# Patient Record
Sex: MALE | Race: BLACK OR AFRICAN AMERICAN
[De-identification: names, ages, dates, MRNs, and addresses within clinical notes are randomized per-mention and may not be internally consistent; named-entity substitution may affect disease eponyms.]

---

## 2017-07-14 ENCOUNTER — HOSPITAL ENCOUNTER (OUTPATIENT)
Dept: HOSPITAL 62 - SC | Age: 52
Discharge: HOME | End: 2017-07-14
Attending: PODIATRIST
Payer: COMMERCIAL

## 2017-07-14 DIAGNOSIS — G43.909: ICD-10-CM

## 2017-07-14 DIAGNOSIS — F17.210: ICD-10-CM

## 2017-07-14 DIAGNOSIS — J45.909: ICD-10-CM

## 2017-07-14 DIAGNOSIS — Z88.0: ICD-10-CM

## 2017-07-14 DIAGNOSIS — Z88.5: ICD-10-CM

## 2017-07-14 DIAGNOSIS — L60.0: Primary | ICD-10-CM

## 2017-07-14 DIAGNOSIS — Z88.6: ICD-10-CM

## 2017-07-14 PROCEDURE — 11750 EXCISION NAIL&NAIL MATRIX: CPT

## 2017-07-14 PROCEDURE — 88304 TISSUE EXAM BY PATHOLOGIST: CPT

## 2017-07-14 PROCEDURE — 0HTRXZZ RESECTION OF TOE NAIL, EXTERNAL APPROACH: ICD-10-PCS | Performed by: PODIATRIST

## 2017-07-14 RX ADMIN — POVIDONE-IODINE ONE APPLIC: 100 OINTMENT TOPICAL at 00:00

## 2017-07-14 RX ADMIN — POVIDONE-IODINE ONE APPLIC: 100 OINTMENT TOPICAL at 11:29

## 2017-07-14 NOTE — [facility] OPERATIVE REPORT E
[facility] Operative Report



NAME: RO SORIANO

                                      MRN: [id number]

                                      AGE: 52Y

DATE OF SURGERY: 07/14/2017          ROOM:



PREOPERATIVE DIAGNOSIS:

Onychoincurvatus hallux bilateral.



POSTOPERATIVE DIAGNOSIS:

Onychoincurvatus hallux bilateral.



PROCEDURE PERFORMED:

1.  Partial nail avulsion, medial and lateral nail borders, hallux

bilateral.

2.  Partial matricectomy, medial and lateral corners of matrix, hallux

bilateral.



SURGEON:

SIDNEY STEPHENSON D.P.M.



FINDINGS:

Intraoperative findings indicated deeply incurvated medial and lateral nail

borders causing continuous friction into the nail grooves which has led to

lots of discomfort and pain, especially when the patient wears closed-toe

shoes.  Intraoperative findings were confirmed clinically and

radiographically.



PROCEDURE:

With the patient laying in a dorsal recumbent position, both feet were

prepped and draped in the usual standard sterile orthopedic manner after

the local anesthesia was administered which was a digital block.  The type

of anesthesia utilized was a 50/50 mixture of 2% Xylocaine and 0.5%

Marcaine.  After the anesthetic effect was accomplished attention was

directed to the right hallux first.  At this point the medial and lateral

nail borders were removed en toto.  The nail grooves were cleaned from any

debris.  Next, a digital tourniquet was applied at the base of the right

hallux to control hemostasis.  At this point two 1 cm in length oblique

incisions were placed at the medial and lateral junction of the eponychium

of the proximal nail groove.  The incisions were angulated about 45 degrees

to the long axis of the distal phalanx.  At this point the incisions were

taken all the way to bone.  The skin flaps were created.  The corners of

the matrix on the medial and lateral sides were excised en toto.  The bone

was curetted to remove any remnants of matrix that had been left behind,

and after that electrodesiccation was performed to eradicate any remnants

of matrix tissue.  The electrodesiccation also helped with hemostasis and

to create a sterile environment.  The area was irrigated.  The dead spaces

were packed with Gelfoam.  The skin flaps were repositioned and anchored

down with 4-0 nylon using continuous interlocked stitch.  At this point

Betadine compression dressing was applied around the right hallux.  The

digital tourniquet was removed and circulation returned to normal

immediately as the normal digital color and temperature became apparent.



Next, attention was directed to the left hallux and exactly the same

procedures were performed at this point.



The patient tolerated the procedures well and left the operating room with

stable vital signs and in good condition.  The patient was taken to the

recovery room alert, conscious and oriented.  There are no permanent

disabilities anticipated at this time.  The patient was discharged home

with instructions for postoperative care at home.  The patient was given a

prescription for pain medicine and antibiotics.  Follow-up appointment was

set in the office in 72 hours.





DICTATING PHYSICIAN: SIDNEY STEPHENSON D.P.M.



[id number]              DT: 07/14/2017 1205

PHY#: [id number]          DD: 07/14/2017 1147

ID:   [id number]               JOB#: [id number]       ACCT: [id number]



cc:SIDNEY STEPHENSON D.P.M.

>

## 2020-08-06 ENCOUNTER — HOSPITAL ENCOUNTER (OUTPATIENT)
Dept: HOSPITAL 62 - OD | Age: 55
End: 2020-08-06
Payer: OTHER GOVERNMENT

## 2020-08-06 DIAGNOSIS — Z51.81: Primary | ICD-10-CM

## 2020-08-06 DIAGNOSIS — Z79.1: ICD-10-CM

## 2020-08-06 LAB
ADD MANUAL DIFF: NO
APPEARANCE UR: CLEAR
APTT BLD: 30.5 SEC (ref 23.5–35.8)
APTT PPP: YELLOW S
BASOPHILS # BLD AUTO: 0.1 10^3/UL (ref 0–0.2)
BASOPHILS NFR BLD AUTO: 1.2 % (ref 0–2)
BILIRUB UR QL STRIP: NEGATIVE
EOSINOPHIL # BLD AUTO: 0.2 10^3/UL (ref 0–0.6)
EOSINOPHIL NFR BLD AUTO: 2.6 % (ref 0–6)
ERYTHROCYTE [DISTWIDTH] IN BLOOD BY AUTOMATED COUNT: 13.6 % (ref 11.5–14)
GLUCOSE UR STRIP-MCNC: NEGATIVE MG/DL
HCT VFR BLD CALC: 41.6 % (ref 37.9–51)
HGB BLD-MCNC: 14.1 G/DL (ref 13.5–17)
INR PPP: 0.96
KETONES UR STRIP-MCNC: NEGATIVE MG/DL
LYMPHOCYTES # BLD AUTO: 2.6 10^3/UL (ref 0.5–4.7)
LYMPHOCYTES NFR BLD AUTO: 32.8 % (ref 13–45)
MCH RBC QN AUTO: 33.9 PG (ref 27–33.4)
MCHC RBC AUTO-ENTMCNC: 34 G/DL (ref 32–36)
MCV RBC AUTO: 100 FL (ref 80–97)
MONOCYTES # BLD AUTO: 1 10^3/UL (ref 0.1–1.4)
MONOCYTES NFR BLD AUTO: 13 % (ref 3–13)
NEUTROPHILS # BLD AUTO: 4.1 10^3/UL (ref 1.7–8.2)
NEUTS SEG NFR BLD AUTO: 50.4 % (ref 42–78)
NITRITE UR QL STRIP: NEGATIVE
PH UR STRIP: 6 [PH] (ref 5–9)
PLATELET # BLD: 305 10^3/UL (ref 150–450)
PROT UR STRIP-MCNC: NEGATIVE MG/DL
PROTHROMBIN TIME: 13 SEC (ref 11.4–15.4)
RBC # BLD AUTO: 4.17 10^6/UL (ref 4.35–5.55)
SP GR UR STRIP: 1.01
TOTAL CELLS COUNTED % (AUTO): 100 %
UROBILINOGEN UR-MCNC: NEGATIVE MG/DL (ref ?–2)
WBC # BLD AUTO: 8 10^3/UL (ref 4–10.5)

## 2020-08-06 PROCEDURE — 36415 COLL VENOUS BLD VENIPUNCTURE: CPT

## 2020-08-06 PROCEDURE — 85730 THROMBOPLASTIN TIME PARTIAL: CPT

## 2020-08-06 PROCEDURE — 85610 PROTHROMBIN TIME: CPT

## 2020-08-06 PROCEDURE — 85025 COMPLETE CBC W/AUTO DIFF WBC: CPT

## 2020-08-06 PROCEDURE — 81001 URINALYSIS AUTO W/SCOPE: CPT

## 2020-09-16 ENCOUNTER — HOSPITAL ENCOUNTER (OUTPATIENT)
Dept: HOSPITAL 62 - RDC | Age: 55
End: 2020-09-16
Attending: NURSE PRACTITIONER
Payer: OTHER GOVERNMENT

## 2020-09-16 VITALS — DIASTOLIC BLOOD PRESSURE: 86 MMHG | SYSTOLIC BLOOD PRESSURE: 122 MMHG

## 2020-09-16 DIAGNOSIS — Z20.828: Primary | ICD-10-CM

## 2020-09-16 DIAGNOSIS — Z88.0: ICD-10-CM

## 2020-09-16 DIAGNOSIS — J45.909: ICD-10-CM

## 2020-09-16 DIAGNOSIS — Z88.6: ICD-10-CM

## 2020-09-16 DIAGNOSIS — F17.210: ICD-10-CM

## 2020-09-16 DIAGNOSIS — Z85.47: ICD-10-CM

## 2020-09-16 DIAGNOSIS — J02.9: ICD-10-CM

## 2020-09-16 PROCEDURE — 87070 CULTURE OTHR SPECIMN AEROBIC: CPT

## 2020-09-16 PROCEDURE — C9803 HOPD COVID-19 SPEC COLLECT: HCPCS

## 2020-09-16 PROCEDURE — 99211 OFF/OP EST MAY X REQ PHY/QHP: CPT

## 2020-09-16 PROCEDURE — 87880 STREP A ASSAY W/OPTIC: CPT

## 2020-09-16 PROCEDURE — 87635 SARS-COV-2 COVID-19 AMP PRB: CPT

## 2020-09-16 PROCEDURE — 99201: CPT

## 2020-09-16 NOTE — ER RDC ASSESSMENT REPORT
Intake





- In the Last 14 days


Have you traveled outside [place]?: No


Have you been in close contact with someone CONFIRMED: No


Worked in Healthcare?: No





- Symptoms


Subjective Fever(Felt feverish): No


Chills: No


Muscule Aches: No


Runny Nose: No


Sore Throat: Yes


Cough (New or worsening chronic cough): No


Shortness of breath: No


Nausea or Vomiting: No


Headache: No


Abdominal Pain: No


Diarrhea(3 or more loose stools in last 24 hours): No





- Do you have any of the following


Chronic lung disease: Asthma or emphysema or COPD: Yes


Cystic Fibrosis: No


Diabetes: No


High Blood Pressure: No


Cardiovascular Disease: No


Chronic Kidney Disease: No


Chronic Liver Disease: No


Chronic blood disorder like Sickle Cell Disease: No


Weak immune system due to disease or medication: No


Neurologic condition that limits movement: No


Developmental delay - Moderate to Severe: No


Morbid Obesity (>100 pounds over ideal weight): No





- Objective


Temperature: 98.0 F


Pulse Rate: 92


Respiratory Rate: 18


Blood Pressure: 122/86


O2 Sat by Pulse Oximetry: 96


Objective: 


Given above, testing performed: 





strep, covid


Disposition: Home; Selfcare





General





- General


Stated Complaint: scratchy throat


Time Seen by Provider: 09/16/20 13:15


Mode of Arrival: Ambulatory


Information source: Patient





- HPI


Notes: 





55-year-old male presents to [facility] clinic for COVID-19 testing.  Patient reports 

no known contact with have a positive individual.  Patient is only reporting 

symptom of sore throat onset 9/14/2020.  He denies any fever chills muscle aches

runny nose cough shortness of breath nausea or vomiting headache abdominal pain 

or diarrhea.





- Related Data


Allergies/Adverse Reactions: 


                                        





aspirin Allergy (Verified 07/11/17 14:20)


   Generalized Itching


morphine Allergy (Verified 07/11/17 14:20)


   Generalized Itching


Penicillins Allergy (Verified 07/11/17 14:20)


   Unknown reaction











Past Medical History





- General


Information source: Patient





- Social History


Smoking Status: Current Every Day Smoker


Cigarette use (# per day): Yes - 3-4


Smoking Education Provided: Yes





- Past Medical History


Cardiac Medical History: Reports: None


   Denies: Hx Heart Attack, Hx Hypertension


Pulmonary Medical History: Reports: Hx Asthma - LAST FLARE UP OVER 2 MONTHS


EENT Medical History: Reports: None


Neurological Medical History: Reports: None.  Denies: Hx Cerebrovascular 

Accident, Hx Seizures


Endocrine Medical History: Reports: None


Renal/ Medical History: Reports: None


Malignancy Medical History: Reports Hx Testicular Cancer


GI Medical History: Reports: None.  Denies: Hx Hepatitis, Hx Hiatal Hernia, Hx 

Ulcer


Musculoskeletal Medical History: Reports None


Skin Medical History: Reports None


Psychiatric Medical History: Reports: None


Traumatic Medical History: Reports: None


Infectious Medical History: Reports: None.  Denies: Hx Hepatitis


Past Surgical History: Reports: Hx Testicular Surgery.  Denies: Hx Open Heart 

Surgery, Hx Pacemaker





Physical Exam





- General


General appearance: Appears well, Alert


In distress: None


Notes: 





PHYSICAL EXAMINATION: 


GENERAL: Well-appearing and in no acute distress. 


HEAD: Atraumatic, normocephalic. 


EYES: sclera anicteric, conjunctiva are normal. 


ENT: nares patent. Moist mucous membranes. 


NECK: Normal range of motion, supple without lymphadenopathy. 


LUNGS: No increased work of breathing. Lung sounds CTAB and equal. No wheezes 

rales or rhonchi. 


HEART: Regular rate and rhythm without murmurs.


ABDOMEN: Soft, nontender, normal bowel sounds, no guarding. 


EXTREMITIES: Normal range of motion, no pitting edema. No cyanosis. 


NEUROLOGICAL: A&O x 3. Normal speech. 


PSYCH: Normal mood, normal affect. 


SKIN: Warm, Dry, normal turgor, no rashes or lesions noted








Patient Education/Counseling


Counseling/Education: 





Patient presents with symptoms associated with possible Covid 19 infection.  

Patient does not have emergency worrying symptoms such as difficulty breathing, 

shortness of breath, chest pain, pressure, confusion or cyanosis.  Patient 

appears suitable for discharge as vital signs are stable and patient is nontoxic

in appearance.  Good return precautions have been discussed with patient, 

patient verbalized understanding and is agreeable with discharge plan of care at

this time.


Guidance for worsening S/SX: 


As a person under investigation for Covid 19, the [place] department of 

Health and Human Services, division of public health advises you to adhere to 

the following guidance until your test results are reported to you.  If your 

test result is positive, you will receive additional information from your 

provider and your local health department at that time.


 


Remain at home until you are cleared by the health provider or public health 

authorities.


 


Keep a log of visitors to your home, notify any visitors to your home of your 

isolation status.


 


If you plan to move to a new address or leave the county, notify the local 

health department in your County.


 


Call your doctor or seek care if you have an urgent medical need.  Before 

seeking medical care, call ahead to get instructions from the provider before 

arriving at the medical office clinic or hospital.  Notify them that you are 

being tested for the virus that causes Covid 19 so that arrangements can be 

made, as necessary, to prevent transmission to others in the healthcare setting.

 Next, notify the local health department in your county.


 


If a medical emergency arises and you need to call 911, inform the first 

responders that you are being tested for the virus that causes Covid 19.  Next, 

notify the local health department in your county.





RDC Discharge





- Discharge


Clinical Impression: 


 Encounter for screening laboratory testing for COVID-19 virus





Condition: Good


Disposition: Home; Selfcare